# Patient Record
Sex: FEMALE | Race: WHITE | NOT HISPANIC OR LATINO
[De-identification: names, ages, dates, MRNs, and addresses within clinical notes are randomized per-mention and may not be internally consistent; named-entity substitution may affect disease eponyms.]

---

## 2017-05-08 ENCOUNTER — HOSPITAL (OUTPATIENT)
Dept: OTHER | Age: 27
End: 2017-05-08
Attending: EMERGENCY MEDICINE

## 2018-01-23 PROBLEM — Z01.419 ENCOUNTER FOR GYNECOLOGICAL EXAMINATION WITHOUT ABNORMAL FINDING: Status: ACTIVE | Noted: 2018-01-23

## 2018-01-23 PROCEDURE — 88175 CYTOPATH C/V AUTO FLUID REDO: CPT | Performed by: OBSTETRICS & GYNECOLOGY

## 2018-02-13 PROBLEM — R87.612 PAPANICOLAOU SMEAR OF CERVIX WITH LOW GRADE SQUAMOUS INTRAEPITHELIAL LESION (LGSIL): Status: ACTIVE | Noted: 2018-02-13

## 2018-04-03 PROBLEM — N87.1 CIN II (CERVICAL INTRAEPITHELIAL NEOPLASIA II): Status: ACTIVE | Noted: 2018-04-03

## 2018-04-19 PROBLEM — Z98.890 S/P LEEP: Status: ACTIVE | Noted: 2018-04-19

## 2018-10-29 PROBLEM — L72.3 INFECTED SEBACEOUS CYST OF SKIN: Status: ACTIVE | Noted: 2018-10-29

## 2018-10-29 PROBLEM — L08.9 INFECTED SEBACEOUS CYST OF SKIN: Status: ACTIVE | Noted: 2018-10-29

## 2018-11-05 PROCEDURE — 88175 CYTOPATH C/V AUTO FLUID REDO: CPT | Performed by: OBSTETRICS & GYNECOLOGY

## 2018-11-05 PROCEDURE — 87624 HPV HI-RISK TYP POOLED RSLT: CPT | Performed by: OBSTETRICS & GYNECOLOGY

## 2019-02-11 PROBLEM — Z98.890 S/P LEEP: Status: RESOLVED | Noted: 2018-04-19 | Resolved: 2019-02-11

## 2019-02-11 PROCEDURE — 80323 ALKALOIDS NOS: CPT | Performed by: INTERNAL MEDICINE

## 2019-02-20 PROBLEM — E78.2 MIXED HYPERLIPIDEMIA: Status: ACTIVE | Noted: 2019-02-20

## 2019-03-15 PROCEDURE — 83935 ASSAY OF URINE OSMOLALITY: CPT | Performed by: UROLOGY

## 2019-03-15 PROCEDURE — 83930 ASSAY OF BLOOD OSMOLALITY: CPT | Performed by: UROLOGY

## 2019-11-10 PROBLEM — Z30.42 ENCOUNTER FOR SURVEILLANCE OF INJECTABLE CONTRACEPTIVE: Status: ACTIVE | Noted: 2019-11-10

## 2020-09-30 PROBLEM — M79.18 CHRONIC MYOFASCIAL PAIN: Status: ACTIVE | Noted: 2020-09-30

## 2020-09-30 PROBLEM — M79.18 MYOFASCIAL PAIN SYNDROME, CERVICAL: Status: ACTIVE | Noted: 2020-09-30

## 2020-09-30 PROBLEM — G89.29 CHRONIC MYOFASCIAL PAIN: Status: ACTIVE | Noted: 2020-09-30

## 2020-12-30 PROCEDURE — 88305 TISSUE EXAM BY PATHOLOGIST: CPT | Performed by: OBSTETRICS & GYNECOLOGY

## 2021-12-03 PROBLEM — L72.3 INFECTED SEBACEOUS CYST OF SKIN: Status: RESOLVED | Noted: 2018-10-29 | Resolved: 2021-12-03

## 2021-12-03 PROBLEM — M79.18 CHRONIC MYOFASCIAL PAIN: Status: RESOLVED | Noted: 2020-09-30 | Resolved: 2021-12-03

## 2021-12-03 PROBLEM — G89.29 CHRONIC MYOFASCIAL PAIN: Status: RESOLVED | Noted: 2020-09-30 | Resolved: 2021-12-03

## 2021-12-03 PROBLEM — M79.18 MYOFASCIAL PAIN SYNDROME, CERVICAL: Status: RESOLVED | Noted: 2020-09-30 | Resolved: 2021-12-03

## 2021-12-03 PROBLEM — L08.9 INFECTED SEBACEOUS CYST OF SKIN: Status: RESOLVED | Noted: 2018-10-29 | Resolved: 2021-12-03

## 2021-12-03 PROBLEM — N87.1 CIN II (CERVICAL INTRAEPITHELIAL NEOPLASIA II): Status: RESOLVED | Noted: 2018-04-03 | Resolved: 2021-12-03

## 2021-12-03 PROBLEM — Z30.42 ENCOUNTER FOR SURVEILLANCE OF INJECTABLE CONTRACEPTIVE: Status: RESOLVED | Noted: 2019-11-10 | Resolved: 2021-12-03

## 2024-02-06 LAB
AMB EXT CHLAMYDIA, NUCLEIC ACID AMP: NOT DETECTED
AMB EXT GONOCOCCUS, NUCLEIC ACID AMP: NOT DETECTED
AMB EXT TREPONEMAL ANTIBODIES: NON REACTIVE
AMB EXT TREPONEMAL ANTIBODIES: NON REACTIVE
ANTIBODY SCREEN OB: NEGATIVE
HEPATITIS B SURFACE ANTIGEN OB: NEGATIVE
HEPATITIS B SURFACE ANTIGEN OB: NEGATIVE
HIV RESULT OB: NEGATIVE
HIV RESULT OB: NEGATIVE
RH FACTOR OB: POSITIVE

## 2024-07-12 LAB
AMB EXT TREPONEMAL ANTIBODIES: NON REACTIVE
AMB EXT TREPONEMAL ANTIBODIES: NON REACTIVE
HIV RESULT OB: NEGATIVE
HIV RESULT OB: NEGATIVE

## 2024-09-03 LAB
STREP GP B CULT OB: NEGATIVE
STREP GP B CULT OB: NEGATIVE

## 2024-10-03 ENCOUNTER — TELEPHONE (OUTPATIENT)
Dept: OBGYN UNIT | Facility: HOSPITAL | Age: 34
End: 2024-10-03

## 2024-10-03 RX ORDER — CHOLECALCIFEROL (VITAMIN D3) 25 MCG
1 TABLET,CHEWABLE ORAL DAILY
Status: ON HOLD | COMMUNITY

## 2024-10-03 RX ORDER — FERROUS SULFATE 325(65) MG
325 TABLET, DELAYED RELEASE (ENTERIC COATED) ORAL
Status: ON HOLD | COMMUNITY

## 2024-10-04 ENCOUNTER — ORDERS FOR ADMISSION (OUTPATIENT)
Dept: OBGYN UNIT | Facility: HOSPITAL | Age: 34
End: 2024-10-04

## 2024-10-04 RX ORDER — ACETAMINOPHEN 500 MG
1000 TABLET ORAL EVERY 6 HOURS PRN
Status: CANCELLED | OUTPATIENT
Start: 2024-10-04

## 2024-10-04 RX ORDER — LIDOCAINE HYDROCHLORIDE 10 MG/ML
30 INJECTION, SOLUTION EPIDURAL; INFILTRATION; INTRACAUDAL; PERINEURAL ONCE
Status: CANCELLED | OUTPATIENT
Start: 2024-10-04 | End: 2024-10-04

## 2024-10-04 RX ORDER — ONDANSETRON 2 MG/ML
4 INJECTION INTRAMUSCULAR; INTRAVENOUS EVERY 6 HOURS PRN
Status: CANCELLED | OUTPATIENT
Start: 2024-10-04

## 2024-10-04 RX ORDER — SODIUM CHLORIDE, SODIUM LACTATE, POTASSIUM CHLORIDE, CALCIUM CHLORIDE 600; 310; 30; 20 MG/100ML; MG/100ML; MG/100ML; MG/100ML
INJECTION, SOLUTION INTRAVENOUS AS NEEDED
Status: CANCELLED | OUTPATIENT
Start: 2024-10-04

## 2024-10-04 RX ORDER — ACETAMINOPHEN 500 MG
500 TABLET ORAL EVERY 6 HOURS PRN
Status: CANCELLED | OUTPATIENT
Start: 2024-10-04

## 2024-10-04 RX ORDER — TERBUTALINE SULFATE 1 MG/ML
0.25 INJECTION, SOLUTION SUBCUTANEOUS AS NEEDED
Status: CANCELLED | OUTPATIENT
Start: 2024-10-04

## 2024-10-04 RX ORDER — DEXTROSE, SODIUM CHLORIDE, SODIUM LACTATE, POTASSIUM CHLORIDE, AND CALCIUM CHLORIDE 5; .6; .31; .03; .02 G/100ML; G/100ML; G/100ML; G/100ML; G/100ML
INJECTION, SOLUTION INTRAVENOUS CONTINUOUS
Status: CANCELLED | OUTPATIENT
Start: 2024-10-04

## 2024-10-04 RX ORDER — IBUPROFEN 600 MG/1
600 TABLET, FILM COATED ORAL ONCE AS NEEDED
Status: CANCELLED | OUTPATIENT
Start: 2024-10-04

## 2024-10-04 RX ORDER — CITRIC ACID/SODIUM CITRATE 334-500MG
30 SOLUTION, ORAL ORAL AS NEEDED
Status: CANCELLED | OUTPATIENT
Start: 2024-10-04

## 2024-10-06 ENCOUNTER — HOSPITAL ENCOUNTER (INPATIENT)
Facility: HOSPITAL | Age: 34
LOS: 3 days | Discharge: HOME | End: 2024-10-09
Attending: STUDENT IN AN ORGANIZED HEALTH CARE EDUCATION/TRAINING PROGRAM | Admitting: STUDENT IN AN ORGANIZED HEALTH CARE EDUCATION/TRAINING PROGRAM
Payer: COMMERCIAL

## 2024-10-06 ENCOUNTER — APPOINTMENT (OUTPATIENT)
Dept: OBGYN CLINIC | Facility: HOSPITAL | Age: 34
End: 2024-10-06
Attending: STUDENT IN AN ORGANIZED HEALTH CARE EDUCATION/TRAINING PROGRAM
Payer: COMMERCIAL

## 2024-10-06 PROBLEM — Z34.90 PREGNANCY (HCC): Status: ACTIVE | Noted: 2024-10-06

## 2024-10-06 LAB
ANTIBODY SCREEN: NEGATIVE
BASOPHILS # BLD AUTO: 0.02 X10(3) UL (ref 0–0.2)
BASOPHILS NFR BLD AUTO: 0.3 %
DEPRECATED RDW RBC AUTO: 57.1 FL (ref 35.1–46.3)
EOSINOPHIL # BLD AUTO: 0.12 X10(3) UL (ref 0–0.7)
EOSINOPHIL NFR BLD AUTO: 1.9 %
ERYTHROCYTE [DISTWIDTH] IN BLOOD BY AUTOMATED COUNT: 15 % (ref 11–15)
HCT VFR BLD AUTO: 33 %
HGB BLD-MCNC: 11.4 G/DL
IMM GRANULOCYTES # BLD AUTO: 0.02 X10(3) UL (ref 0–1)
IMM GRANULOCYTES NFR BLD: 0.3 %
LYMPHOCYTES # BLD AUTO: 1.83 X10(3) UL (ref 1–4)
LYMPHOCYTES NFR BLD AUTO: 28.8 %
MCH RBC QN AUTO: 35.6 PG (ref 26–34)
MCHC RBC AUTO-ENTMCNC: 34.5 G/DL (ref 31–37)
MCV RBC AUTO: 103.1 FL
MONOCYTES # BLD AUTO: 0.42 X10(3) UL (ref 0.1–1)
MONOCYTES NFR BLD AUTO: 6.6 %
NEUTROPHILS # BLD AUTO: 3.94 X10 (3) UL (ref 1.5–7.7)
NEUTROPHILS # BLD AUTO: 3.94 X10(3) UL (ref 1.5–7.7)
NEUTROPHILS NFR BLD AUTO: 62.1 %
PLATELET # BLD AUTO: 268 10(3)UL (ref 150–450)
RBC # BLD AUTO: 3.2 X10(6)UL
RH BLOOD TYPE: POSITIVE
RH BLOOD TYPE: POSITIVE
T PALLIDUM AB SER QL IA: NONREACTIVE
WBC # BLD AUTO: 6.4 X10(3) UL (ref 4–11)

## 2024-10-06 PROCEDURE — 86850 RBC ANTIBODY SCREEN: CPT | Performed by: OBSTETRICS & GYNECOLOGY

## 2024-10-06 PROCEDURE — 86900 BLOOD TYPING SEROLOGIC ABO: CPT | Performed by: OBSTETRICS & GYNECOLOGY

## 2024-10-06 PROCEDURE — 86901 BLOOD TYPING SEROLOGIC RH(D): CPT | Performed by: OBSTETRICS & GYNECOLOGY

## 2024-10-06 PROCEDURE — 85025 COMPLETE CBC W/AUTO DIFF WBC: CPT | Performed by: OBSTETRICS & GYNECOLOGY

## 2024-10-06 PROCEDURE — 3E0DXGC INTRODUCTION OF OTHER THERAPEUTIC SUBSTANCE INTO MOUTH AND PHARYNX, EXTERNAL APPROACH: ICD-10-PCS | Performed by: OBSTETRICS & GYNECOLOGY

## 2024-10-06 PROCEDURE — 86780 TREPONEMA PALLIDUM: CPT | Performed by: STUDENT IN AN ORGANIZED HEALTH CARE EDUCATION/TRAINING PROGRAM

## 2024-10-06 RX ORDER — ACETAMINOPHEN 500 MG
1000 TABLET ORAL EVERY 6 HOURS PRN
Status: DISCONTINUED | OUTPATIENT
Start: 2024-10-06 | End: 2024-10-07

## 2024-10-06 RX ORDER — IBUPROFEN 600 MG/1
600 TABLET, FILM COATED ORAL ONCE AS NEEDED
Status: COMPLETED | OUTPATIENT
Start: 2024-10-06 | End: 2024-10-07

## 2024-10-06 RX ORDER — SODIUM CHLORIDE, SODIUM LACTATE, POTASSIUM CHLORIDE, CALCIUM CHLORIDE 600; 310; 30; 20 MG/100ML; MG/100ML; MG/100ML; MG/100ML
INJECTION, SOLUTION INTRAVENOUS AS NEEDED
Status: DISCONTINUED | OUTPATIENT
Start: 2024-10-06 | End: 2024-10-08

## 2024-10-06 RX ORDER — ACETAMINOPHEN 500 MG
500 TABLET ORAL EVERY 6 HOURS PRN
Status: DISCONTINUED | OUTPATIENT
Start: 2024-10-06 | End: 2024-10-07

## 2024-10-06 RX ORDER — LIDOCAINE HYDROCHLORIDE 10 MG/ML
30 INJECTION, SOLUTION EPIDURAL; INFILTRATION; INTRACAUDAL; PERINEURAL ONCE
Status: COMPLETED | OUTPATIENT
Start: 2024-10-06 | End: 2024-10-07

## 2024-10-06 RX ORDER — CITRIC ACID/SODIUM CITRATE 334-500MG
30 SOLUTION, ORAL ORAL AS NEEDED
Status: DISCONTINUED | OUTPATIENT
Start: 2024-10-06 | End: 2024-10-08

## 2024-10-06 RX ORDER — DEXTROSE, SODIUM CHLORIDE, SODIUM LACTATE, POTASSIUM CHLORIDE, AND CALCIUM CHLORIDE 5; .6; .31; .03; .02 G/100ML; G/100ML; G/100ML; G/100ML; G/100ML
INJECTION, SOLUTION INTRAVENOUS CONTINUOUS
Status: DISCONTINUED | OUTPATIENT
Start: 2024-10-06 | End: 2024-10-08

## 2024-10-06 RX ORDER — ONDANSETRON 2 MG/ML
4 INJECTION INTRAMUSCULAR; INTRAVENOUS EVERY 6 HOURS PRN
Status: DISCONTINUED | OUTPATIENT
Start: 2024-10-06 | End: 2024-10-08

## 2024-10-06 RX ORDER — TERBUTALINE SULFATE 1 MG/ML
0.25 INJECTION, SOLUTION SUBCUTANEOUS AS NEEDED
Status: DISCONTINUED | OUTPATIENT
Start: 2024-10-06 | End: 2024-10-08

## 2024-10-07 ENCOUNTER — ANESTHESIA EVENT (OUTPATIENT)
Dept: OBGYN UNIT | Facility: HOSPITAL | Age: 34
End: 2024-10-07
Payer: COMMERCIAL

## 2024-10-07 ENCOUNTER — ANESTHESIA (OUTPATIENT)
Dept: OBGYN UNIT | Facility: HOSPITAL | Age: 34
End: 2024-10-07
Payer: COMMERCIAL

## 2024-10-07 PROCEDURE — 0KQM0ZZ REPAIR PERINEUM MUSCLE, OPEN APPROACH: ICD-10-PCS | Performed by: OBSTETRICS & GYNECOLOGY

## 2024-10-07 PROCEDURE — 3E033VJ INTRODUCTION OF OTHER HORMONE INTO PERIPHERAL VEIN, PERCUTANEOUS APPROACH: ICD-10-PCS | Performed by: OBSTETRICS & GYNECOLOGY

## 2024-10-07 PROCEDURE — 88307 TISSUE EXAM BY PATHOLOGIST: CPT | Performed by: OBSTETRICS & GYNECOLOGY

## 2024-10-07 RX ORDER — BUPIVACAINE HCL/0.9 % NACL/PF 0.25 %
5 PLASTIC BAG, INJECTION (ML) EPIDURAL AS NEEDED
Status: DISCONTINUED | OUTPATIENT
Start: 2024-10-07 | End: 2024-10-08

## 2024-10-07 RX ORDER — BUPIVACAINE HYDROCHLORIDE 2.5 MG/ML
20 INJECTION, SOLUTION EPIDURAL; INFILTRATION; INTRACAUDAL ONCE
Status: DISCONTINUED | OUTPATIENT
Start: 2024-10-07 | End: 2024-10-08

## 2024-10-07 RX ORDER — NALBUPHINE HYDROCHLORIDE 10 MG/ML
10 INJECTION INTRAMUSCULAR; INTRAVENOUS; SUBCUTANEOUS EVERY 4 HOURS PRN
Status: DISCONTINUED | OUTPATIENT
Start: 2024-10-07 | End: 2024-10-08

## 2024-10-07 RX ORDER — DOCUSATE SODIUM 100 MG/1
100 CAPSULE, LIQUID FILLED ORAL
Status: DISCONTINUED | OUTPATIENT
Start: 2024-10-07 | End: 2024-10-09

## 2024-10-07 RX ORDER — NALBUPHINE HYDROCHLORIDE 10 MG/ML
2.5 INJECTION INTRAMUSCULAR; INTRAVENOUS; SUBCUTANEOUS
Status: DISCONTINUED | OUTPATIENT
Start: 2024-10-07 | End: 2024-10-08

## 2024-10-07 RX ORDER — HYDROXYZINE HYDROCHLORIDE 50 MG/ML
50 INJECTION, SOLUTION INTRAMUSCULAR EVERY 4 HOURS PRN
Status: DISCONTINUED | OUTPATIENT
Start: 2024-10-07 | End: 2024-10-08

## 2024-10-07 RX ORDER — BUPIVACAINE HYDROCHLORIDE 2.5 MG/ML
INJECTION, SOLUTION EPIDURAL; INFILTRATION; INTRACAUDAL
Status: COMPLETED | OUTPATIENT
Start: 2024-10-07 | End: 2024-10-07

## 2024-10-07 RX ORDER — ACETAMINOPHEN 500 MG
1000 TABLET ORAL EVERY 6 HOURS PRN
Status: DISCONTINUED | OUTPATIENT
Start: 2024-10-07 | End: 2024-10-09

## 2024-10-07 RX ORDER — AMMONIA INHALANTS 0.04 G/.3ML
0.3 INHALANT RESPIRATORY (INHALATION) AS NEEDED
Status: DISCONTINUED | OUTPATIENT
Start: 2024-10-07 | End: 2024-10-09

## 2024-10-07 RX ORDER — BISACODYL 10 MG
10 SUPPOSITORY, RECTAL RECTAL ONCE AS NEEDED
Status: DISCONTINUED | OUTPATIENT
Start: 2024-10-07 | End: 2024-10-09

## 2024-10-07 RX ORDER — SIMETHICONE 80 MG
80 TABLET,CHEWABLE ORAL 3 TIMES DAILY PRN
Status: DISCONTINUED | OUTPATIENT
Start: 2024-10-07 | End: 2024-10-09

## 2024-10-07 RX ORDER — LIDOCAINE HYDROCHLORIDE AND EPINEPHRINE 15; 5 MG/ML; UG/ML
INJECTION, SOLUTION EPIDURAL
Status: COMPLETED | OUTPATIENT
Start: 2024-10-07 | End: 2024-10-07

## 2024-10-07 RX ORDER — ACETAMINOPHEN 500 MG
500 TABLET ORAL EVERY 6 HOURS PRN
Status: DISCONTINUED | OUTPATIENT
Start: 2024-10-07 | End: 2024-10-09

## 2024-10-07 RX ORDER — IBUPROFEN 600 MG/1
600 TABLET, FILM COATED ORAL EVERY 6 HOURS
Status: DISCONTINUED | OUTPATIENT
Start: 2024-10-07 | End: 2024-10-09

## 2024-10-07 RX ORDER — LIDOCAINE HYDROCHLORIDE 10 MG/ML
INJECTION, SOLUTION INFILTRATION; PERINEURAL
Status: COMPLETED | OUTPATIENT
Start: 2024-10-07 | End: 2024-10-07

## 2024-10-07 RX ADMIN — BUPIVACAINE HYDROCHLORIDE 5 ML: 2.5 INJECTION, SOLUTION EPIDURAL; INFILTRATION; INTRACAUDAL at 13:45:00

## 2024-10-07 RX ADMIN — LIDOCAINE HYDROCHLORIDE 5 ML: 10 INJECTION, SOLUTION INFILTRATION; PERINEURAL at 13:45:00

## 2024-10-07 RX ADMIN — LIDOCAINE HYDROCHLORIDE AND EPINEPHRINE 5 ML: 15; 5 INJECTION, SOLUTION EPIDURAL at 13:45:00

## 2024-10-07 NOTE — PROGRESS NOTES
[facility]    Labor Progress Note    Malina Christiansen Patient Status:  Inpatient    1990 MRN [id number]   Location [facility] Attending Shellie Poole,    Hosp Day # 1 PCP Travis Diaz MD       Subjective   Interval History:   Feeling uncomfortable     Objective   Vital signs in last 24 hours:  Temp:  [98 °F (36.7 °C)-98.3 °F (36.8 °C)] 98.3 °F (36.8 °C)  Pulse:  [69-88] 69  Resp:  [16-18] 16  BP: (118-129)/(84-85) 126/84    Input/Output:  No intake or output data in the 24 hours ending 10/07/24 1015    General: breathing through contractions    FHT assessment:  120, positive accels, no decels, moderate variability      Sterile vaginal exam:  Dilation: 1 cm dilation   Effacement: 70  Station: -2   Position: cephalic   Cervix soft, mid position     Results:     Recent Labs   Lab 10/06/24  194   RBC 3.20*   HGB 11.4*   HCT 33.0*   .1*   MCH 35.6*   MCHC 34.5   RDW 15.0   NEPRELIM 3.94   WBC 6.4   .0         Assessment/Plan   IUP at 41w1d with Induction of labor  S/p cytotec times 2 doses, last dose at 3am  Plan for Pitocin per protocol if contractions space out  Epidural when she desires   Plan discussed with patient who verbalizes understanding and agreement.  FHT: category I     Antonia Zaman MD  10/7/2024

## 2024-10-07 NOTE — PROGRESS NOTES
[facility] [place]  part of [facility]    Labor Progress Note    Malina Christiansen Patient Status:  Inpatient    1990 MRN [id number]   Location [facility] Attending Shellie Poole,    Hosp Day # 1 PCP Travis Diaz MD     Objective   Vital signs in last 24 hours:  Temp:  [98 °F (36.7 °C)-98.3 °F (36.8 °C)] 98.3 °F (36.8 °C)  Pulse:  [69-93] 77  Resp:  [16-18] 16  BP: ()/(53-85) 117/59  SpO2:  [96 %-99 %] 97 %    Input/Output:  No intake or output data in the 24 hours ending 10/07/24 1555      FHT assessment:  125, positive accels, occasional variable decels, moderate variability  [place]: q3-5 min        Sterile vaginal exam:  Per RN 1/100/-3, head ballotable     Results:       Assessment/Plan   IUP at 41w1d withEarly latent labor.  Plan for IV Pitocin augmentation continued     Antonia Zaman MD  10/7/2024

## 2024-10-07 NOTE — ANESTHESIA PREPROCEDURE EVALUATION
Anesthesia PreOp Note    HPI:     Malina Christiansen is a 34 year old female who presents for preoperative consultation requested by: * No surgeons listed *    Date of Surgery: 10/7/2024    * No procedures listed *  Indication: * No pre-op diagnosis entered *    Relevant Problems   No relevant active problems       NPO:                         History Review:  Patient Active Problem List    Diagnosis Date Noted    Pregnancy (HCC) 10/06/2024    Mixed hyperlipidemia 02/20/2019    Papanicolaou smear of cervix with low grade squamous intraepithelial lesion (LGSIL) 02/13/2018       Past Medical History:    HPV in female    S/P LEEP       Past Surgical History:   Procedure Laterality Date    Colposcopy,bx cervix/endocerv curr  2019    Leep      Per pt unsure if 2018/       Medications Prior to Admission   Medication Sig Dispense Refill Last Dose    prenatal vitamin with DHA 27-0.8-228 MG Oral Cap Take 1 capsule by mouth daily.   10/6/2024    ferrous sulfate 325 (65 FE) MG Oral Tab EC Take 1 tablet (325 mg total) by mouth daily with breakfast.   10/6/2024    rosuvastatin 20 MG Oral Tab Take 1 tablet (20 mg total) by mouth nightly. 90 tablet 1     Drospirenone-Ethinyl Estradiol 3-0.02 MG Oral Tab Take 1 tablet by mouth daily. Patient taking continuously and may run out early 84 tablet 5     Norethin Ace-Eth Estrad-FE (JUNEL FE 1/20) 1-20 MG-MCG Oral Tab Take 1 tablet by mouth daily. 3 Package 5     Multiple Vitamin (MULTI-VITAMIN) Oral Tab Take 1 tablet by mouth daily.        Current Facility-Administered Medications Ordered in Epic   Medication Dose Route Frequency Provider Last Rate Last Admin    nalbuphine (Nubain) 10 mg/mL injection 10 mg  10 mg Intravenous Q4H PRN Shellie Poole, DO   10 mg at 10/07/24 0151    hydrOXYzine 50 mg/mL injection 50 mg  50 mg Intramuscular Q4H PRN Shellie Poole DO   50 mg at 10/07/24 0151    fentaNYL (Sublimaze) 50 mcg/mL injection 50 mcg  50 mcg Intravenous Q30 Min PRN Antonia Zaman MD         fentaNYL-bupivacaine 2 mcg/mL-0.125% in sodium chloride 0.9% 100 mL EPIDURAL infusion premix   Epidural Continuous Asa Davidson MD        fentaNYL (Sublimaze) 50 mcg/mL injection 100 mcg  100 mcg Epidural Once Asa Davidson MD        bupivacaine PF (Marcaine) 0.25% injection  20 mL Epidural Once Asa Dvaidson MD        EPHEDrine (PF) 25 MG/5 ML injection 5 mg  5 mg Intravenous PRN Asa Davidson MD   10 mg at 10/07/24 1520    nalbuphine (Nubain) 10 mg/mL injection 2.5 mg  2.5 mg Intravenous Q15 Min PRN Asa Davidson MD        dextrose in lactated ringers 5% infusion   Intravenous Continuous Raine Payton MD   Held at 10/06/24 1930    lactated ringers infusion   Intravenous PRN Raine Payton  mL/hr at 10/07/24 0615 New Bag at 10/07/24 0615    lactated ringers IV bolus 500 mL  500 mL Intravenous PRN Raine Payton MD        acetaminophen (Tylenol Extra Strength) tab 500 mg  500 mg Oral Q6H PRN Raine Payton MD        acetaminophen (Tylenol Extra Strength) tab 1,000 mg  1,000 mg Oral Q6H PRN Raine Payton MD        ibuprofen (Motrin) tab 600 mg  600 mg Oral Once PRN Raine Payton MD        ondansetron (Zofran) 4 MG/2ML injection 4 mg  4 mg Intravenous Q6H PRN Raine Payton MD        oxyTOCIN in sodium chloride 0.9% (Pitocin) 30 Units/500mL infusion premix  62.5-900 lizzy-units/min Intravenous Continuous Raine Payton MD        terbutaline (Brethine) 1 MG/ML injection 0.25 mg  0.25 mg Subcutaneous PRN Raine Payton MD        sodium citrate-citric acid (Bicitra) 500-334 MG/5ML oral solution 30 mL  30 mL Oral PRN Raine Payton MD        lidocaine PF (Xylocaine-MPF) 1% injection  30 mL Intradermal Once Raine Payton MD        oxyTOCIN in sodium chloride 0.9% (Pitocin) 30 Units/500mL infusion premix  0.5-20 lizzy-units/min Intravenous Continuous Raine Payton MD 4 mL/hr at 10/07/24 1215 4 lizzy-units/min at 10/07/24  1215     No current [facility]-ordered outpatient medications on file.       No Known Allergies    Family History   Problem Relation Age of Onset    Cancer Maternal Grandfather         lung    Cancer Paternal Grandfather         lung    Cancer Paternal Cousin Female         breast     Social History     Socioeconomic History    Marital status:     Number of children: 0   Occupational History    Occupation:    Tobacco Use    Smoking status: Never    Smokeless tobacco: Never   Vaping Use    Vaping status: Never Used   Substance and Sexual Activity    Alcohol use: Yes     Comment: rarely less than 1 per week    Drug use: No    Sexual activity: Yes     Partners: Male     Birth control/protection: Condom, Pill       Available pre-op labs reviewed.  Lab Results   Component Value Date    WBC 6.4 10/06/2024    RBC 3.20 (L) 10/06/2024    HGB 11.4 (L) 10/06/2024    HCT 33.0 (L) 10/06/2024    .1 (H) 10/06/2024    MCH 35.6 (H) 10/06/2024    MCHC 34.5 10/06/2024    RDW 15.0 10/06/2024    .0 10/06/2024             Vital Signs:  Body mass index is 32.88 kg/m².   height is 1.549 m (5' 1\") and weight is 78.9 kg (174 lb). Her oral temperature is 98.3 °F (36.8 °C). Her blood pressure is 122/82 and her pulse is 78. Her respiration is 16 and oxygen saturation is 98%.   Vitals:    10/07/24 1406 10/07/24 1411 10/07/24 1415 10/07/24 1430   BP: 124/70 121/76 124/72 122/82   Pulse: 75 73 75 78   Resp:       Temp:       TempSrc:       SpO2: 98% 97% 97% 98%   Weight:       Height:            Anesthesia Evaluation     Patient summary reviewed and Nursing notes reviewed    Airway   Mallampati: II  TM distance: >3 FB  Dental      Pulmonary - negative ROS and normal exam   Cardiovascular - negative ROS and normal exam    Neuro/Psych - negative ROS     GI/Hepatic/Renal - negative ROS     Endo/Other    Abdominal                  Anesthesia Plan:   ASA:  2  Emergent    Plan:   Epidural  Post-op Pain Management: IV  analgesics  Informed Consent Plan and Risks Discussed With:  Patient  Use of Blood Products Discussed With:  Patient  Blood Product Use Consented    Discussed plan with:  Surgeon      I have informed Malina Christiansen and/or legal guardian or family member of the nature of the anesthetic plan, benefits, risks including possible dental damage if relevant, major complications, and any alternative forms of anesthetic management.   All of the patient's questions were answered to the best of my ability. The patient desires the anesthetic management as planned.  Asa Davidson MD  10/7/2024 3:23 PM  Present on Admission:  **None**

## 2024-10-07 NOTE — H&P
[facility]  part of [facility]    History & Physical    Malina Christiansen Patient Status:  Inpatient    1990 MRN [id number]   Location [facility] Attending Shellie Poole,    Hosp Day # 0 PCP Travis Diaz MD     Date of Admission:  10/6/2024      HPI:   Malina Christiansen is a 34 year old  female, current EGA of 41w0d with an estimated date of delivery of: 2024, by Last Menstrual Period who presents due to induction of labor.     Being admitted for induction of labor.      Her current obstetrical history is significant for obesity, abnormal 1 hour GCT, with normal 3 hour GTT, left fetal pyelectasis with hydronephrosis.    Patient Active Problem List   Diagnosis    Papanicolaou smear of cervix with low grade squamous intraepithelial lesion (LGSIL)    Mixed hyperlipidemia    Pregnancy (HCC)         History   Obstetric History:   OB History    Para Term  AB Living   1 0 0 0 0 0   SAB IAB Ectopic Multiple Live Births   0 0 0 0 0      # Outcome Date GA Lbr Ronaldo/2nd Weight Sex Type Anes PTL Lv   1 Current                Past Medical History:   Past Medical History:    HPV in female    S/P LEEP       Past Surgerical History:   Past Surgical History:   Procedure Laterality Date    Colposcopy,bx cervix/endocerv curr  2019    Leep      Per pt unsure if 2018/       Social History:   Social History     Tobacco Use    Smoking status: Never    Smokeless tobacco: Never   Substance Use Topics    Alcohol use: Yes     Comment: rarely less than 1 per week        Allergies/Medications:   Allergies:   No Known Allergies    Medications:  Medications Prior to Admission   Medication Sig Dispense Refill Last Dose    prenatal vitamin with DHA 27-0.8-228 MG Oral Cap Take 1 capsule by mouth daily.       ferrous sulfate 325 (65 FE) MG Oral Tab EC Take 1 tablet (325 mg total) by mouth daily with breakfast.       rosuvastatin 20 MG Oral Tab Take 1 tablet (20  mg total) by mouth nightly. 90 tablet 1     Drospirenone-Ethinyl Estradiol 3-0.02 MG Oral Tab Take 1 tablet by mouth daily. Patient taking continuously and may run out early 84 tablet 5     Norethin Ace-Eth Estrad-FE (JUNEL FE 1/20) 1-20 MG-MCG Oral Tab Take 1 tablet by mouth daily. 3 Package 5     Multiple Vitamin (MULTI-VITAMIN) Oral Tab Take 1 tablet by mouth daily.            Review of Systems:   As documented in HPI      Physical Exam:   Temp:  [98.1 °F (36.7 °C)] 98.1 °F (36.7 °C)  Pulse:  [88] 88  Resp:  [18] 18  BP: (129)/(85) 129/85    Neurologic: Alert and oriented  Psychiatric: Cooperative  Constitutional: alert and cooperative  Pulm: non labored breathing  CV: regular rate  Abdomen: soft,  nontender, gravid    SVE: FT/70/-3  FHT assessment: 135bpm/mod/+accels/-decels  [place]: none       Results:     Recent Results (from the past 24 hour(s))   CBC With Differential With Platelet    Collection Time: 10/06/24  7:49 PM   Result Value Ref Range    WBC 6.4 4.0 - 11.0 x10(3) uL    RBC 3.20 (L) 3.80 - 5.30 x10(6)uL    HGB 11.4 (L) 12.0 - 16.0 g/dL    HCT 33.0 (L) 35.0 - 48.0 %    .1 (H) 80.0 - 100.0 fL    MCH 35.6 (H) 26.0 - 34.0 pg    MCHC 34.5 31.0 - 37.0 g/dL    RDW-SD 57.1 (H) 35.1 - 46.3 fL    RDW 15.0 11.0 - 15.0 %    .0 150.0 - 450.0 10(3)uL    Neutrophil Absolute Prelim 3.94 1.50 - 7.70 x10 (3) uL    Neutrophil Absolute 3.94 1.50 - 7.70 x10(3) uL    Lymphocyte Absolute 1.83 1.00 - 4.00 x10(3) uL    Monocyte Absolute 0.42 0.10 - 1.00 x10(3) uL    Eosinophil Absolute 0.12 0.00 - 0.70 x10(3) uL    Basophil Absolute 0.02 0.00 - 0.20 x10(3) uL    Immature Granulocyte Absolute 0.02 0.00 - 1.00 x10(3) uL    Neutrophil % 62.1 %    Lymphocyte % 28.8 %    Monocyte % 6.6 %    Eosinophil % 1.9 %    Basophil % 0.3 %    Immature Granulocyte % 0.3 %   ABORH (Blood Type)    Collection Time: 10/06/24  7:49 PM   Result Value Ref Range    ABO BLOOD TYPE A     RH BLOOD TYPE Positive    Antibody Screen     Collection Time: 10/06/24  7:49 PM   Result Value Ref Range    Antibody Screen Negative    T Pallidum Screening Cascade    Collection Time: 10/06/24  7:49 PM   Result Value Ref Range    Treponemal Antibodies Nonreactive Nonreactive        Prenatal Labs  Blood Type: A  Rh positive  HIV- non reactive  Syphilis- non reactive  Hepatitis B: non reactive  Hepatitis C: non reactive  Rubella immune  GBS negative.       Imaging:  Reviewed MFM growth at 37w   Ultrasound Findings:  Single IUP in cephalic presentation.  Cardiac activity is present at 142 bpm.  Placenta is anterior.   A 3 vessel cord is noted.  EFW 3080 g (6 lb 13 oz) 45%.   SHAUN is 13.2 cm. MVP is 5.4 cm.  BPP is 8/8  Left kidney: Hydronephrosis, renal pelvis measuring 12.5 mm. Dilated ureter measuring 8 mm.     Assessment/Plan:     Malina Christiansen is a 34 year old  female, current EGA of 41w0d who presents for admission due to induction of labor for late term    IOL  - cytotec cervical ripening    Fetal pyelectasis and hydronephrosis   - plan  bilateral renal US and peds urology follow up as indicated    Shellie Poole DO  10/6/2024  8:43 PM

## 2024-10-07 NOTE — ANESTHESIA PROCEDURE NOTES
Labor Analgesia    Date/Time: 10/7/2024 1:45 PM    Performed by: Asa Davidson MD  Authorized by: Asa Davidson MD      General Information and Staff    Start Time:  10/7/2024 1:45 PM  End Time:  10/7/2024 1:55 PM  Anesthesiologist:  Asa Davidson MD  Performed by:  Anesthesiologist  Patient Location:  OB  Site Identification: surface landmarks    Reason for Block: labor epidural    Preanesthetic Checklist: patient identified, IV checked, risks and benefits discussed, monitors and equipment checked, pre-op evaluation, timeout performed, anesthesia consent and sterile technique used      Procedure Details    Patient Position:  Sitting  Prep: Betadine and patient draped    Monitoring:  Heart rate, cardiac monitor and continuous pulse ox  Approach:  Midline    Epidural Needle    Injection Technique:  CATIE air  Needle Gauge:  18 G  Needle Length:  3.375 in  Needle Insertion Depth:  6  Location:  L3-4    Spinal Needle      Catheter    Catheter Type:  Side hole  Catheter Size:  20 G  Catheter at Skin Depth:  14  Test Dose:  Negative    Assessment    Sensory Level:  T8    Additional Comments

## 2024-10-08 LAB
BASOPHILS # BLD AUTO: 0.04 X10(3) UL (ref 0–0.2)
BASOPHILS NFR BLD AUTO: 0.3 %
DEPRECATED RDW RBC AUTO: 59.4 FL (ref 35.1–46.3)
EOSINOPHIL # BLD AUTO: 0.04 X10(3) UL (ref 0–0.7)
EOSINOPHIL NFR BLD AUTO: 0.3 %
ERYTHROCYTE [DISTWIDTH] IN BLOOD BY AUTOMATED COUNT: 15.2 % (ref 11–15)
HCT VFR BLD AUTO: 29.6 %
HGB BLD-MCNC: 10 G/DL
IMM GRANULOCYTES # BLD AUTO: 0.07 X10(3) UL (ref 0–1)
IMM GRANULOCYTES NFR BLD: 0.5 %
LYMPHOCYTES # BLD AUTO: 1.7 X10(3) UL (ref 1–4)
LYMPHOCYTES NFR BLD AUTO: 13.3 %
MCH RBC QN AUTO: 35.8 PG (ref 26–34)
MCHC RBC AUTO-ENTMCNC: 33.8 G/DL (ref 31–37)
MCV RBC AUTO: 106.1 FL
MONOCYTES # BLD AUTO: 0.87 X10(3) UL (ref 0.1–1)
MONOCYTES NFR BLD AUTO: 6.8 %
NEUTROPHILS # BLD AUTO: 10.11 X10 (3) UL (ref 1.5–7.7)
NEUTROPHILS # BLD AUTO: 10.11 X10(3) UL (ref 1.5–7.7)
NEUTROPHILS NFR BLD AUTO: 78.8 %
PLATELET # BLD AUTO: 218 10(3)UL (ref 150–450)
RBC # BLD AUTO: 2.79 X10(6)UL
WBC # BLD AUTO: 12.8 X10(3) UL (ref 4–11)

## 2024-10-08 PROCEDURE — 85025 COMPLETE CBC W/AUTO DIFF WBC: CPT | Performed by: OBSTETRICS & GYNECOLOGY

## 2024-10-08 NOTE — PROGRESS NOTES
Report received from ROGER Yan.    NP Note discussed with Primary Attending.    Patient is a 89y old  Female who presents with a chief complaint of Cough (09 Mar 2022 11:29)      INTERVAL HPI/OVERNIGHT EVENTS: no new complaints    MEDICATIONS  (STANDING):  ALBUTerol    0.083% 2.5 milliGRAM(s) Nebulizer every 6 hours  ALBUTerol    0.083%. 2.5 milliGRAM(s) Nebulizer once  aspirin  chewable 81 milliGRAM(s) Oral daily  dorzolamide 2%/timolol 0.5% Ophthalmic Solution 1 Drop(s) Both EYES two times a day  dronedarone 400 milliGRAM(s) Oral two times a day  ferrous    sulfate 325 milliGRAM(s) Oral daily  heparin   Injectable 5000 Unit(s) SubCutaneous every 12 hours  hydrALAZINE 100 milliGRAM(s) Oral three times a day  influenza  Vaccine (HIGH DOSE) 0.7 milliLiter(s) IntraMuscular once  insulin lispro (ADMELOG) corrective regimen sliding scale   SubCutaneous three times a day before meals  isosorbide   mononitrate ER Tablet (IMDUR) 120 milliGRAM(s) Oral daily  isosorbide   mononitrate ER Tablet (IMDUR) 60 milliGRAM(s) Oral daily  latanoprost 0.005% Ophthalmic Solution 1 Drop(s) Both EYES at bedtime  methylPREDNISolone sodium succinate Injectable 40 milliGRAM(s) IV Push every 12 hours  metoprolol tartrate 100 milliGRAM(s) Oral two times a day  montelukast 10 milliGRAM(s) Oral at bedtime  pantoprazole    Tablet 40 milliGRAM(s) Oral before breakfast  simvastatin 40 milliGRAM(s) Oral at bedtime    MEDICATIONS  (PRN):  benzonatate 100 milliGRAM(s) Oral three times a day PRN Cough      __________________________________________________  REVIEW OF SYSTEMS:    CONSTITUTIONAL: No fever,   EYES: no acute visual disturbances  NECK: No pain or stiffness  RESPIRATORY: Wheezing, cough; No shortness of breath  CARDIOVASCULAR: No chest pain, no palpitations  GASTROINTESTINAL: No pain. No nausea or vomiting; No diarrhea   NEUROLOGICAL: No headache or numbness, no tremors  MUSCULOSKELETAL: weakness,  No joint pain, no muscle pain  GENITOURINARY: no dysuria, no frequency, no hesitancy  PSYCHIATRY: no depression , no anxiety  ALL OTHER  ROS negative        Vital Signs Last 24 Hrs  T(C): 36.2 (09 Mar 2022 05:09), Max: 36.7 (08 Mar 2022 13:37)  T(F): 97.2 (09 Mar 2022 05:09), Max: 98 (08 Mar 2022 13:37)  HR: 72 (09 Mar 2022 07:20) (60 - 75)  BP: 185/86 (09 Mar 2022 07:20) (185/86 - 207/76)  BP(mean): --  RR: 18 (09 Mar 2022 07:20) (18 - 18)  SpO2: 97% (09 Mar 2022 07:20) (94% - 100%)    ________________________________________________  PHYSICAL EXAM:  GENERAL: NAD  HEENT: Normocephalic;  conjunctivae and sclerae clear; moist mucous membranes;   NECK : supple  CHEST/LUNG: Wheezing b/l,    HEART: S1 S2  regular; no murmurs, gallops or rubs  ABDOMEN: Soft, Nontender, Nondistended; Bowel sounds present  EXTREMITIES: no cyanosis; no edema; no calf tenderness  SKIN: warm and dry; no rash  NERVOUS SYSTEM:  Awake and alert; Oriented  to place, person and time ; no new deficits    _________________________________________________  LABS:                        12.0   10.77 )-----------( 169      ( 09 Mar 2022 05:51 )             35.2     03-09    142  |  107  |  23<H>  ----------------------------<  130<H>  3.7   |  31  |  1.14    Ca    9.3      09 Mar 2022 05:51  Phos  2.7     03-08  Mg     2.6     03-08    TPro  7.1  /  Alb  3.2<L>  /  TBili  0.3  /  DBili  x   /  AST  21  /  ALT  23  /  AlkPhos  51  03-09        CAPILLARY BLOOD GLUCOSE      POCT Blood Glucose.: 127 mg/dL (09 Mar 2022 07:45)  POCT Blood Glucose.: 151 mg/dL (08 Mar 2022 21:38)  POCT Blood Glucose.: 117 mg/dL (08 Mar 2022 16:48)        RADIOLOGY & ADDITIONAL TESTS:  ACC: [id number] EXAM:  CT CHEST                          PROCEDURE DATE:  03/07/2022          INTERPRETATION:  INDICATION: Pneumonia    TECHNIQUE: Volumetric images of the chest without intravenous contrast.   Maximum intensity projection images weregenerated.    COMPARISON: 12/15/2021.    FINDINGS:    LUNGS/AIRWAYS/PLEURA: No endobronchial lesion. No bronchiectasis. Mild   air trapping in both lungs. Stable mild subpleural scarring in the right   apex. Unremarkable pleura.    LYMPH NODES/MEDIASTINUM: Stable enlarged multinodular thyroid. No   enlarged lymph nodes.    HEART/VASCULATURE: Enlarged left atrium. No pericardial effusion. Leads   within the right atrium and right ventricle. Mildly calcified coronary   arteries and aorta. Normal caliber aorta and main pulmonary artery.    UPPER ABDOMEN: Stable too small to characterize hypodensity in the left   hepatic lobe. Partially included left kidney cyst.    BONES/SOFT TISSUES: Degenerative changes of the spine.      IMPRESSION:    No pneumonia.    --- End of Report ---            CHARBEL ANNA M.D., ATTENDING RADIOGIST  This document has been electronically signed. Mar  7 2022 11:27AM      ACC: [id number] EXAM:  XR CHEST PORTABLE IMMED 1V                          PROCEDURE DATE:  03/07/2022          INTERPRETATION:  INDICATION: shortness of breath    PRIORS: December 10, 2021    VIEWS: Portable AP radiography of the chest performed.    FINDINGS: Heart size appears within normal limits. Note is made of an   indwelling pacemaker. No hilar or superior mediastinal abnormalities are   identified. There is no evidence for focal infiltrate, lobar   consolidation or pulmonary edema. No pleural effusion or pneumothorax is   demonstrated. No mediastinal shift is noted. The visualized osseous   structures appear unremarkable.    IMPRESSION: No evidence for focal infiltrate or lobar consolidation.    --- End of Report ---            BOONE BUTLER; Attending Radiologist  This document has been electronically signed. Mar  7 2022  9:02AM    Imaging  Reviewed:  YES/NO    Consultant(s) Notes Reviewed:   YES/ No      Plan of care was discussed with patient and /or primary care giver; all questions and concerns were addressed

## 2024-10-08 NOTE — ANESTHESIA POSTPROCEDURE EVALUATION
Patient: Malina Christiansen    Procedure Summary       Date: 10/07/24 Room / Location:     Anesthesia Start: 1345 Anesthesia Stop: 2120    Procedure: LABOR ANALGESIA Diagnosis:     Scheduled Providers:  Anesthesiologist: Asa Davidson MD    Anesthesia Type: epidural ASA Status: 2 - Emergent            Anesthesia Type: epidural    Vitals Value Taken Time   /82 10/07/24 2146   Temp 97.6 10/07/24 2159   Pulse 91 10/07/24 2150   Resp 14 10/07/24 2159   SpO2 98 % 10/07/24 2150   Vitals shown include unfiled device data.    [facility] AN Post Evaluation:   Patient Evaluated in PACU  Patient Participation: complete - patient participated  Level of Consciousness: awake  Pain Management: adequate  Airway Patency:patent  Dental exam unchanged from preop  Yes    Cardiovascular Status: acceptable  Respiratory Status: acceptable  Postoperative Hydration acceptable      Asa Davidson MD  10/7/2024 9:59 PM

## 2024-10-08 NOTE — PROGRESS NOTES
[facility]  part of [facility]    Labor Progress Note    Malina Christiansen Patient Status:  Inpatient    1990 MRN [id number]   Location [facility] Attending Shellie Poole,    Hosp Day # 1 PCP Travis Diaz MD       Subjective   Interval History:   Feeling lots of pressure     Objective   Vital signs in last 24 hours:  Temp:  [98 °F (36.7 °C)-98.3 °F (36.8 °C)] 98.3 °F (36.8 °C)  Pulse:  [68-93] 79  Resp:  [16-18] 16  BP: ()/(53-85) 124/82  SpO2:  [96 %-99 %] 98 %    Input/Output:  No intake or output data in the 24 hours ending 10/07/24 2052    General: breathing through contractions    FHT assessment:  120, positive accels, recurrent variable decels, moderate variability  [place]: q2-3 min     Sterile vaginal exam:          C/100/+2     Results:     Recent Labs   Lab 10/06/24  1949   RBC 3.20*   HGB 11.4*   HCT 33.0*   .1*   MCH 35.6*   MCHC 34.5   RDW 15.0   NEPRELIM 3.94   WBC 6.4   .0           Assessment/Plan   IUP at 41w1d with induction of labor.  Artificial rupture of membranes at this time, clear fluid.   Fetal scalp electrode placed.   Plan to begin pushing   Plan discussed with patient who verbalizes understanding and agreement.  FHT: category 2 with moderate variability, accels with scalp stim     Antonia Zaman MD

## 2024-10-08 NOTE — LACTATION NOTE
10/08/24 1530   Evaluation Type   Evaluation Type Inpatient   Problems identified   Problems identified Knowledge deficit   Maternal history   Maternal history Induction of labor;Obesity   Breastfeeding goal   Breastfeeding goal To maintain breast milk feeding per patient goal   Maternal Assessment   Bilateral Breasts Soft;Symmetrical   Bilateral Nipples Flat;Elastic   Prior breastfeeding experience (comment below) Primip   Breastfeeding Assistance Breastfeeding assistance provided with permission   Pain assessment   Location/Comment denies   Guidelines for use of:   Breast pump type Ameda Platinum   Suggested use of pump Pump 8-12X/24hr;Pump each time a supplement is offered;Pump if infant is not latching to breast   Other (comment) Called to SCN to assist with a latch, infant was able to attain a latch in cross cradle position to the right breast, mom has flat nipples, infant nursed for about 10 minutes, no compression stripe or misshaped nipple after feed, encouraged to call if needed.

## 2024-10-08 NOTE — PROGRESS NOTES
[facility]  part of [facility]    OB/Gyne Post  Progress Note      Malina Christiansen Patient Status:  Inpatient    1990 MRN [id number]   Location [facility] Attending Shellie Poole,    Hosp Day # 2 PCP Travis Diaz MD       Subjective     Pt denies N/V/F/C/CP/SOB/palpitations, dizziness, headache, blurry vision, leg pain/calf pain.       Good pain control.   Tolerating present diet.   Ambulating well. Voiding freely.  Breastfeeding: Yes   Vaginal bleeding: Decreasing     Objective   Vital signs in last 24 hours:  Temp:  [98 °F (36.7 °C)-98.3 °F (36.8 °C)] 98.3 °F (36.8 °C)  Pulse:  [] 83  Resp:  [16-18] 16  BP: ()/(53-90) 126/76  SpO2:  [96 %-99 %] 98 %    Input/Output:    Intake/Output Summary (Last 24 hours) at 10/8/2024 0018  Last data filed at 10/7/2024 2148  Gross per 24 hour   Intake --   Output 619 ml   Net -619 ml         Constitutional: comfortable  Abdomen: soft, nontender, nondistended  Uterus: fundus firm and 1 cm below umbilicus,   Extremities: No calf tenderness; no c/c; BLE edema 1(+)      Results:   Labs / Path / Radiology:    No results found for this or any previous visit (from the past 24 hour(s)).    Specimens (From admission, onward)      None            No results found.      Assessment/Plan   34 year oldyo  , s/p induced vaginal, PPD# 1       Patient Active Problem List   Diagnosis    Papanicolaou smear of cervix with low grade squamous intraepithelial lesion (LGSIL)    Mixed hyperlipidemia    Pregnancy (HCC)   .    Postpartum:  -Pt doing well  -Pain tolerable and controlled  -Breastfeeding, lactation consultant available fo assistance    2. Anemia:  Hgb s/p delivery 10  Most likelly 2/2 delivery  Asymptomatic and hemodynamically stable  Will encourage cont PNV and increase intake of iron rich foods    3. Disposition:  ambulate, continue routine postpartum care      The patient had a male infant, and does  not desire circumcision.   Her baby is in the NICU    Raine Payton MD  10/8/2024

## 2024-10-08 NOTE — LACTATION NOTE
10/08/24 1315   Evaluation Type   Evaluation Type Inpatient   Problems identified   Problems identified Knowledge deficit   Maternal history   Maternal history Induction of labor;Obesity   Breastfeeding goal   Breastfeeding goal To maintain breast milk feeding per patient goal   Maternal Assessment   Prior breastfeeding experience (comment below) Primip   Breastfeeding Assistance LC assistance declined at this time   Pain assessment   Location/Comment denies   Guidelines for use of:   Breast pump type Ameda Platinum   Current use of pump: mom said she has not pumped often   Suggested use of pump Pump 8-12X/24hr;Pump each time a supplement is offered;Pump if infant is not latching to breast   Other (comment) Infant in SCN, mom said that she has not pumped often, encouraged to pump every 3 hours while infant is in SCN, oftered to do a pump session with her but she wanted to spend time with her parents, encouraged to call if needed.

## 2024-10-08 NOTE — L&D DELIVERY NOTE
[facility]  part of [facility]    Vaginal Delivery Note    Malina Christiansen Patient Status:  Inpatient    1990 MRN [id number]   Location [facility] Attending Shellie Poole DO   Hosp Day # 1 PCP Travis Diaz MD     Delivery     Surgeon: Antonia Zaman MD    Maternal Anesthesia: epidural     Infant  Date of Delivery: 10/7/2024    Time of Delivery: 9:15 PM   Delivery Type: Normal spontaneous vaginal delivery     Infant Sex  Information for the patient's :  Shaka Christiansen [[id number]]   male     Infant Birthweight  Information for the patient's :  Shaka Christiansen [[id number]]   No birth weight on file.      Presentation    Position          Apgars:  1 minute:                 5 minutes:                          10 minutes:      Placenta:  Date/Time of Delivery:     Delivery: spontaneous  Placenta to Pathology: yes    Umbilical Cord:  Cord Gases Submitted: yes  Cord Blood/Tissue Collection: yes  Cord Complications: single nuchal and body  Sponge and Needle Counts:  Verified      Episiotomy/Laceration Repair  Laceration: perineal 2nd degree    Delivery Complications  none    Neonatologist Present: no    Delivery Narrative:  I was in the room and pushing with the patient since approx .  There were recurrent decelerations with contractions. There was return to baseline 120 in between contractions with moderate variability noted. There was an acceleration with scalp stim at   We had patient push on her left side and then push on her right side. Her pushing on the right side was very effective so she remained in this position.  Patient pushed and delivered a live male in KRISTIE position,over intact perineum.  Upon delivery of the fetal head, the neck was checked for a nuchal cord.   There was a nuchal and body cord that was reduced.   The nose and mouth were bulb suctioned.  Infant was delivered in total.     Delayed cord clamping was  performed.  Umbilical cord was doubly clamped & cut while the baby was on the maternal abdomen.  Lacerations and repair as noted above  Cervix was inspected and no cervical lacerations or trailing membranes noted.  No uterine inversion noted.  No periuretheral / sulcus lacerations.   Placenta was delivered spontaneously intact & normal in appearance with 3 vessel cord.   EBL 300ml    Antonia Zamna MD   10/7/2024  9:36 PM      Shaka Christiansen [[id number]]      Labor Events     labor?: No   steroids?: None  Antibiotics received during labor?: No  Labor type: Induced Onset of Labor  Induction: Misoprostol  Indications for induction: Post-term Gestation  Intrapartum & labor complications: Late decelerations, Variable decelerations        Presentation    No data filed       Operative Delivery    No data filed       Shoulder Dystocia    No data filed       Anesthesia    Method: Epidural               Delivery      Head delivery date/time: 10/7/2024 21:15:14   Delivery date/time:  10/7/24 21:15:31   Delivery type: Normal spontaneous vaginal delivery    Details:            Delivery Providers       Delivery personnel:  Provider Role    Baby Nurse    Delivery Nurse             Cord    No data filed        Measurements    No data filed       Placenta    No data filed       Apgars    No data filed       Skin to Skin    No data filed       Vaginal Count    No data filed       Lacerations    No data filed

## 2024-10-09 VITALS
RESPIRATION RATE: 16 BRPM | SYSTOLIC BLOOD PRESSURE: 129 MMHG | OXYGEN SATURATION: 98 % | TEMPERATURE: 98 F | BODY MASS INDEX: 32.85 KG/M2 | HEART RATE: 82 BPM | WEIGHT: 174 LBS | DIASTOLIC BLOOD PRESSURE: 76 MMHG | HEIGHT: 61 IN

## 2024-10-09 RX ORDER — ACETAMINOPHEN 500 MG
500 TABLET ORAL EVERY 6 HOURS PRN
Qty: 60 TABLET | Refills: 0 | Status: SHIPPED | OUTPATIENT
Start: 2024-10-09

## 2024-10-09 RX ORDER — IBUPROFEN 600 MG/1
600 TABLET, FILM COATED ORAL EVERY 6 HOURS
Qty: 60 TABLET | Refills: 0 | Status: SHIPPED | OUTPATIENT
Start: 2024-10-09

## 2024-10-09 NOTE — LACTATION NOTE
10/09/24 0830   Evaluation Type   Evaluation Type Inpatient   Problems identified   Problems identified Knowledge deficit   Problems Identified Other infant in SCN   Maternal history   Maternal history Induction of labor;Obesity   Breastfeeding goal   Breastfeeding goal To maintain breast milk feeding per patient goal   Maternal Assessment   Prior breastfeeding experience (comment below) Primip   Breastfeeding Assistance Breastfeeding assistance provided with permission;Pumping assistance provided with permission   Pain assessment   Location/Comment denies   Guidelines for use of:   Breast pump type Ameda Platinum;Spectra;Other   Current use of pump: pumping every three hours   Suggested use of pump Pump 8-12X/24hr;Pump each time a supplement is offered;Pump if infant is not latching to breast   Other (comment) Mom may go home today, she says that she has been pumping every three hours and remains in the SCN, offered to assist with a latch in SCN, has a pump for home, information sheet for spectra pump given, offered hospital grad pump rental, encouraged to call the [place] lactation clinic if needed.

## 2024-10-09 NOTE — PROGRESS NOTES
[facility]  part of [facility]    OB/GYNE Progress Note      Malina Christiansen Patient Status:  Inpatient    1990 MRN [id number]   Location [facility] 3SE Attending Shellie Poole,    Hosp Day # 3 PCP Travis Diaz MD       Assessment/Plan       Assessment  Problems:   Patient Active Problem List   Diagnosis    Papanicolaou smear of cervix with low grade squamous intraepithelial lesion (LGSIL)    Mixed hyperlipidemia    Pregnancy (HCC)          PPD #2  Home, infant in [facility], anticipate discharge 10/10.  No Circ.        Subjective       Review of Systems:  Constitutional: feeling well and pain free      Objective   Vital signs in last 24 hours:  Temp:  [98 °F (36.7 °C)-98.2 °F (36.8 °C)] 98 °F (36.7 °C)  Pulse:  [82-87] 82  Resp:  [16] 16  BP: (129-130)/(76-85) 129/76    Input/Output:  No intake or output data in the 24 hours ending 10/09/24 1346    Weight (Last 6):  Wt Readings from Last 6 Encounters:   10/06/24 174 lb (78.9 kg)   21 155 lb (70.3 kg)   21 152 lb (68.9 kg)   20 152 lb (68.9 kg)   20 149 lb (67.6 kg)   20 148 lb (67.1 kg)     Body mass index is 32.88 kg/m².    Constitutional: comfortable  Uterus: fundus firm and fundus below umbilicus  Extremities: No calf tenderness      Swift Catheter Information  Does patient have a swift catheter: no      Results:          No results found.    Akosua Hall MD  10/9/2024  1:46 PM

## 2024-10-09 NOTE — DISCHARGE SUMMARY
[facility]  part of [facility]    Discharge Summary    Malina Christiansen Patient Status:  Inpatient    1990 MRN [id number]   Location [facility] 3SE Attending Shellie Poole DO   Hosp Day # 3 PCP Travis Diaz MD     Date of Admission: 10/6/2024    Admission Diagnoses: pregnancy  Pregnancy (HCC)    Date of Discharge:     Discharge Diagnoses:S/P Vaginal Delivery    Episode Diagnoses:   pregnancy Problems (from 10/03/24 to present)       No problems associated with this episode.                  Hospital Course:     EDC: Estimated Date of Delivery: 24    Gestational Age: 41w1d    Date of Delivery: 10/7/2024  Time of Delivery: 9:15 PM    Antepartum complications: none    Delivered By: IVY EDWARDS    Delivery Method: Normal spontaneous vaginal delivery    Delivery Procedures:     Baby: male      Apgars:  1 minute:   7                 5 minutes: 9                          10 minutes:      Feeding Method:The patient is currently breastfeeding.     Intrapartum Complications: None    Lacerations      Perineal lacerations: 1st Repaired?: Yes     Vaginal laceration?: No      Cervical laceration?: No    Clitoral laceration?: No             Episiotomy: None    Placenta: Spontaneous    Postpartum complications: None              Pending Labs       Order Current Status    Specimen to Pathology Tissue In process            Discharge Plan:   Discharge Condition: Good    Discharge medications:  Current Discharge Medication List        New Orders    Details   acetaminophen 500 MG Oral Tab Take 1 tablet (500 mg total) by mouth every 6 (six) hours as needed.      ibuprofen 600 MG Oral Tab Take 1 tablet (600 mg total) by mouth every 6 (six) hours.           Home Meds - Unchanged    Details   prenatal vitamin with DHA 27-0.8-228 MG Oral Cap Take 1 capsule by mouth daily.      ferrous sulfate 325 (65 FE) MG Oral Tab EC Take 1 tablet (325 mg total) by mouth daily with  Please print prescan out of Media Tab and then complete and sign.   Once form is completed and signed, please fax to [phone].    Please direct any questions to [phone].   Thanks,  Forms Completion Team.  breakfast.      Multiple Vitamin (MULTI-VITAMIN) Oral Tab Take 1 tablet by mouth daily.                   Discharge Diet: As tolerated    Discharge Activity: Pelvic rest until cleared    Follow up:     Follow Up in the Office: 6 weeks for postpartum visit     Follow-up Information       [facility]. Call.    Specialty: Pediatrics  Why: As needed  Contact information:  [address]  [facility] [address]  [phone]  Additional information:  Masks are optional for all patients and visitors, unless otherwise indicated.             Antonia Zaman MD Follow up in 6 week(s).    Specialty: OBSTETRICS & GYNECOLOGY  Contact information:  [address] [facility] [address]  [address] [address]  [phone]                                     Akosua Hall MD  10/9/2024

## 2024-10-09 NOTE — DISCHARGE INSTRUCTIONS
-Pelvic rest for 6 weeks; no sex, tampons, douching, baths, or pools until after 6 weeks check-up.  -No heavy lifting; increase activity gradually.  -No driving if taking narcotics.    CALL YOUR PROVIDER IF:  Increased/heavy bleeding (I.e. Changing a saturated pad every hour).  New onset chills/fever greater than 100.4.  Pain in your vagina or abdomen that gets worse and isn't relieved with medicine.  Swelling or discharge with a bad odor from vagina.  Burning, pain, red streaks, or lumpy areas in your breasts that may be accompanied by flu-like symptoms.  Painful urination, or inability to control urination.  Nausea, vomiting, dizziness, or fainting.  Feelings of extreme sadness or anxiety, or a feeling that you don’t want to be with your baby.  Redness, warmth, or pain in the lower leg.  Chest pain or shortness of breath.  If : Check incision site AT LEAST 2x daily for signs and symptoms of infection (increased redness, warmth, tenderness, or drainage)    Mom & Baby Hour: Meets in person every WEDNESDAY at 10am at the [facility] in [place] ([address]) in the community education room. The group is for new moms and their babies up to 6 months of age. It includes breastfeeding supports with a certified lactation educator to be available to answer your breastfeeding questions.

## 2024-10-09 NOTE — PLAN OF CARE
Problem: Patient Centered Care  Goal: Patient preferences are identified and integrated in the patient's plan of care  Description: Interventions:  - What would you like us to know as we care for you?   - Provide timely, complete, and accurate information to patient/family  - Incorporate patient and family knowledge, values, beliefs, and cultural backgrounds into the planning and delivery of care  - Encourage patient/family to participate in care and decision-making at the level they choose  - Honor patient and family perspectives and choices  Outcome: Progressing     Problem: Patient/Family Goals  Goal: Patient/Family Long Term Goal  Description: Patient's Long Term Goal:     Interventions:  -   - See additional Care Plan goals for specific interventions  Outcome: Progressing  Goal: Patient/Family Short Term Goal  Description: Patient's Short Term Goal:     Interventions:   -   - See additional Care Plan goals for specific interventions  Outcome: Progressing     Problem: POSTPARTUM  Goal: Long Term Goal:Experiences normal postpartum course  Description: INTERVENTIONS:  - Assess and monitor vital signs and lab values.  - Assess fundus and lochia.  - Provide ice/sitz baths for perineum discomfort.  - Monitor healing of incision/episiotomy/laceration, and assess for signs and symptoms of infection and hematoma.  - Assess bladder function and monitor for bladder distention.  - Provide/instruct/assist with pericare as needed.  - Provide VTE prophylaxis as needed.  - Monitor bowel function.  - Encourage ambulation and provide assistance as needed.  - Assess and monitor emotional status and provide social service/psych resources as needed.  - Utilize standard precautions and use personal protective equipment as indicated. Ensure aseptic care of all intravenous lines and invasive tubes/drains.  - Obtain immunization and exposure to communicable diseases history.  Outcome: Progressing  Goal: Optimize infant feeding at the  breast  Description: INTERVENTIONS:  - Initiate breast feeding within first hour after birth.   - Monitor effectiveness of current breast feeding efforts.  - Assess support systems available to mother/family.  - Identify cultural beliefs/practices regarding lactation, letdown techniques, maternal food preferences.  - Assess mother's knowledge and previous experience with breast feeding.  - Provide information as needed about early infant feeding cues (e.g., rooting, lip smacking, sucking fingers/hand) versus late cue of crying.  - Discuss/demonstrate breast feeding aids (e.g., infant sling, nursing footstool/pillows, and breast pumps).  - Encourage mother/other family members to express feelings/concerns, and actively listen.  - Educate father/SO about benefits of breast feeding and how to manage common lactation challenges.  - Recommend avoidance of specific medications or substances incompatible with breast feeding.  - Assess and monitor for signs of nipple pain/trauma.  - Instruct and provide assistance with proper latch.  - Review techniques for milk expression (breast pumping) and storage of breast milk. Provide pumping equipment/supplies, instructions and assistance, as needed.  - Encourage rooming-in and breast feeding on demand.  - Encourage skin-to-skin contact.  - Provide LC support as needed.  - Assess for and manage engorgement.  - Provide breast feeding education handouts and information on community breast feeding support.   Outcome: Progressing  Goal: Establishment of adequate milk supply with medication/procedure interruptions  Description: INTERVENTIONS:  - Review techniques for milk expression (breast pumping).   - Provide pumping equipment/supplies, instructions, and assistance until it is safe to breastfeed infant.  Outcome: Progressing  Goal: Experiences normal breast weaning course  Description: INTERVENTIONS:  - Assess for and manage engorgement.  - Instruct on breast care.  - Provide comfort  measures.  Outcome: Progressing  Goal: Appropriate maternal -  bonding  Description: INTERVENTIONS:  - Assess caregiver- interactions.  - Assess caregiver's emotional status and coping mechanisms.  - Encourage caregiver to participate in  daily care.  - Assess support systems available to mother/family.  - Provide /case management support as needed.  Outcome: Progressing     Problem: PAIN - ADULT  Goal: Verbalizes/displays adequate comfort level or patient's stated pain goal  Description: INTERVENTIONS:  - Encourage pt to monitor pain and request assistance  - Assess pain using appropriate pain scale  - Administer analgesics based on type and severity of pain and evaluate response  - Implement non-pharmacological measures as appropriate and evaluate response  - Consider cultural and social influences on pain and pain management  - Manage/alleviate anxiety  - Utilize distraction and/or relaxation techniques  - Monitor for opioid side effects  - Notify MD/LIP if interventions unsuccessful or patient reports new pain  - Anticipate increased pain with activity and pre-medicate as appropriate  Outcome: Progressing

## 2024-10-09 NOTE — PROGRESS NOTES
FOCUS: MOM  DISCHARGE    D: DISCHARGE ORDERS RECEIVED FROM PROVIDER.    A: POSTPARTUM  DISCHARGE AVS'S GIVEN AND REVIEWED EXTENSIVELY, PRESCRIPTIONS/MEDICATIONS REVIEWED. VOCALIZED UNDERSTANDING. PATIENT INFORMED WHEN TO MAKE FOLLOW UP APPOINTMENTS WITH PROVIDER AND PEDIATRICIAN.    R: VERBALIZED UNDERSTANDING OF FOLLOW UP INSTRUCTIONS. DISCHARGED IN STABLE CONDITION.

## 2024-11-03 ENCOUNTER — TELEPHONE (OUTPATIENT)
Dept: OBGYN UNIT | Facility: HOSPITAL | Age: 34
End: 2024-11-03

## (undated) NOTE — LETTER
[facility]  Administration of Anesthesia  I, Malina AGUILAR Kuldip agree to be cared for by a physician anesthesiologist alone and/or with a nurse anesthetist, who is specially trained to monitor me and give me medicine to put me to sleep or keep me comfortable during my procedure    I understand that my anesthesiologist and/or anesthetist is not an employee or agent of [facility] or [employer]. He or she works for [facility]    As the patient asking for anesthesia services, I agree to:  Allow the anesthesiologist (anesthesia doctor) to give me medicine and do additional procedures as necessary. Some examples are: Starting or using an “IV” to give me medicine, fluids or blood during my procedure, and having a breathing tube placed to help me breathe when I’m asleep (intubation). In the event that my heart stops working properly, I understand that my anesthesiologist will make every effort to sustain my life, unless otherwise directed by [facility] Do Not Resuscitate documents.  Tell my anesthesia doctor before my procedure:  If I am pregnant.  The last time that I ate or drank.  iii. All of the medicines I take (including prescriptions, herbal supplements, and pills I can buy without a prescription (including street drugs/illegal medications). Failure to inform my anesthesiologist about these medicines may increase my risk of anesthetic complications.  iv.If I am allergic to anything or have had a reaction to anesthesia before.  I understand how the anesthesia medicine will help me (benefits).  I understand that with any type of anesthesia medicine there are risks:  The most common risks are: nausea, vomiting, sore throat, muscle soreness, damage to my eyes, mouth, or teeth (from breathing tube placement).  Rare risks include: remembering what happened during my procedure, allergic reactions to medications, injury to my airway, heart, lungs, vision, nerves, or  muscles and in extremely rare instances death.  My doctor has explained to me other choices available to me for my care (alternatives).  Pregnant Patients (“epidural”):  I understand that the risks of having an epidural (medicine given into my back to help control pain during labor), include itching, low blood pressure, difficulty urinating, headache or slowing of the baby’s heart. Very rare risks include infection, bleeding, seizure, irregular heart rhythms and nerve injury.  Regional Anesthesia (“spinal”, “epidural”, & “nerve blocks”):  I understand that rare but potential complications include headache, bleeding, infection, seizure, irregular heart rhythms, and nerve injury.    _____________________________________________________________________________  Patient (or Representative) Signature/Relationship to Patient  Date   Time    _____________________________________________________________________________   Name (if used)    Language/Organization   Time    _____________________________________________________________________________  Nurse Anesthetist Signature     Date   Time  _____________________________________________________________________________  Anesthesiologist Signature     Date   Time  I have discussed the procedure and information above with the patient (or patient’s representative) and answered their questions. The patient or their representative has agreed to have anesthesia services.    _____________________________________________________________________________  Witness        Date   Time  I have verified that the signature is that of the patient or patient’s representative, and that it was signed before the procedure  Patient Name: Malina Christiansen     : 1990                 Printed: 10/6/2024 at 7:08 PM    Medical Record #: [id number]                                            Page 1 of 1  ----------ANESTHESIA CONSENT----------